# Patient Record
Sex: FEMALE | Race: WHITE | Employment: FULL TIME | URBAN - METROPOLITAN AREA
[De-identification: names, ages, dates, MRNs, and addresses within clinical notes are randomized per-mention and may not be internally consistent; named-entity substitution may affect disease eponyms.]

---

## 2023-04-30 PROBLEM — F33.0 MILD EPISODE OF RECURRENT MAJOR DEPRESSIVE DISORDER: Status: ACTIVE | Noted: 2023-04-30

## 2023-04-30 PROBLEM — I10 ESSENTIAL HYPERTENSION: Status: ACTIVE | Noted: 2023-04-30

## 2023-04-30 PROBLEM — F33.0 MILD EPISODE OF RECURRENT MAJOR DEPRESSIVE DISORDER (HCC): Status: ACTIVE | Noted: 2023-04-30

## 2023-04-30 PROBLEM — G43.709 CHRONIC MIGRAINE WITHOUT AURA WITHOUT STATUS MIGRAINOSUS, NOT INTRACTABLE: Status: ACTIVE | Noted: 2023-04-30

## 2023-04-30 PROBLEM — N39.3 STRESS INCONTINENCE: Status: ACTIVE | Noted: 2023-04-30

## 2023-05-16 NOTE — TELEPHONE ENCOUNTER
----- Message from Ela Mills MD sent at 5/12/2023  1:07 PM CDT -----  Please let her know:  1) CMP, UA, CBC, A1c, TSH is ok  2) Triglycerides are high.  Diet/Exercise needed

## 2023-07-12 NOTE — TELEPHONE ENCOUNTER
Unable to leave a message, busy tone at the end of the call. Edgar Pinto MD  6/14/2023  9:22 AM CDT       Petersen mammograma esta estable con densidad benigna. En 1 year lo repetimos.

## 2023-07-19 NOTE — TELEPHONE ENCOUNTER
Patient is going to drop off physical forms from work. Patient states that previous physical forms were not accepted by her workplace. She did have a physical for this year that was done on April 26. Please advise patient on whether those forms can be filled out with physical date of service that was on 04/26.

## 2024-04-19 DIAGNOSIS — I10 ESSENTIAL HYPERTENSION: ICD-10-CM

## 2024-04-19 DIAGNOSIS — G43.709 CHRONIC MIGRAINE WITHOUT AURA WITHOUT STATUS MIGRAINOSUS, NOT INTRACTABLE: ICD-10-CM

## 2024-04-19 DIAGNOSIS — F33.0 MILD EPISODE OF RECURRENT MAJOR DEPRESSIVE DISORDER (HCC): ICD-10-CM

## 2024-04-25 RX ORDER — CITALOPRAM 20 MG/1
20 TABLET ORAL DAILY
Qty: 30 TABLET | Refills: 0 | OUTPATIENT
Start: 2024-04-25

## 2024-04-25 RX ORDER — PROPRANOLOL HYDROCHLORIDE 80 MG/1
1 CAPSULE, EXTENDED RELEASE ORAL DAILY
Qty: 30 CAPSULE | Refills: 0 | OUTPATIENT
Start: 2024-04-25

## 2024-04-28 NOTE — PROGRESS NOTES
CC: Annual Physical Exam    HPI:   Malu Madrid is a 49 year old female who presents for a complete physical exam.    HCM  -Diet:  Well-balanced.   -Exercise: active  -Skin care:  no concerning lesions/moles      Migraines-> stable    MDD-> stable    HTN-> asymptomatic    Wants to see OBG      Wt Readings from Last 6 Encounters:   04/22/24 174 lb (78.9 kg)   09/21/23 170 lb (77.1 kg)   04/26/23 167 lb (75.8 kg)     Body mass index is 33.98 kg/m².     Results for orders placed or performed in visit on 04/26/23   Comp Metabolic Panel (14)   Result Value Ref Range    Glucose 92 70 - 99 mg/dL    Sodium 140 136 - 145 mmol/L    Potassium 4.0 3.5 - 5.1 mmol/L    Chloride 108 98 - 112 mmol/L    CO2 27.0 21.0 - 32.0 mmol/L    Anion Gap 5 0 - 18 mmol/L    BUN 11 7 - 18 mg/dL    Creatinine 0.72 0.55 - 1.02 mg/dL    BUN/CREA Ratio 15.3 10.0 - 20.0    Calcium, Total 9.0 8.5 - 10.1 mg/dL    Calculated Osmolality 289 275 - 295 mOsm/kg    eGFR-Cr 102 >=60 mL/min/1.73m2    ALT 44 13 - 56 U/L    AST 37 15 - 37 U/L    Alkaline Phosphatase 83 39 - 100 U/L    Bilirubin, Total 0.4 0.1 - 2.0 mg/dL    Total Protein 7.4 6.4 - 8.2 g/dL    Albumin 3.6 3.4 - 5.0 g/dL    Globulin  3.8 2.8 - 4.4 g/dL    A/G Ratio 0.9 (L) 1.0 - 2.0    Patient Fasting for CMP? Yes    Hemoglobin A1C   Result Value Ref Range    HgbA1C 5.6 <5.7 %    Estimated Average Glucose 114 68 - 126 mg/dL   Lipid Panel   Result Value Ref Range    Cholesterol, Total 182 <200 mg/dL    HDL Cholesterol 49 40 - 59 mg/dL    Triglycerides 230 (H) 30 - 149 mg/dL    LDL Cholesterol 94 <100 mg/dL    VLDL 38 (H) 0 - 30 mg/dL    Non HDL Chol 133 (H) <130 mg/dL    Patient Fasting for Lipid? Yes    TSH W Reflex To Free T4   Result Value Ref Range    TSH 3.040 0.358 - 3.740 mIU/mL   Urinalysis, Routine   Result Value Ref Range    Urine Color Yellow Yellow    Clarity Urine Clear Clear    Spec Gravity 1.026 1.005 - 1.030    Glucose Urine Normal Normal mg/dL    Bilirubin Urine Negative Negative     Ketones Urine Negative Negative mg/dL    Blood Urine Negative Negative    pH Urine 5.5 5.0 - 8.0    Protein Urine Negative Negative, Trace mg/dL    Urobilinogen Urine Normal Normal    Nitrite Urine Negative Negative    Leukocyte Esterase Urine Negative Negative    WBC Urine 1-5 0 - 5 /HPF    RBC Urine 0-2 0 - 2 /HPF    Bacteria Urine Rare (A) None Seen /HPF    Squamous Epi. Cells Few (A) None Seen /HPF   CBC W/ DIFFERENTIAL   Result Value Ref Range    WBC 4.3 4.0 - 11.0 x10(3) uL    RBC 4.23 3.80 - 5.30 x10(6)uL    HGB 13.7 12.0 - 16.0 g/dL    HCT 41.5 35.0 - 48.0 %    MCV 98.1 80.0 - 100.0 fL    MCH 32.4 26.0 - 34.0 pg    MCHC 33.0 31.0 - 37.0 g/dL    RDW-SD 47.1 (H) 35.1 - 46.3 fL    RDW 13.0 11.0 - 15.0 %    .0 150.0 - 450.0 10(3)uL    Neutrophil Absolute Prelim 1.36 (L) 1.50 - 7.70 x10 (3) uL    Neutrophil Absolute 1.36 (L) 1.50 - 7.70 x10(3) uL    Lymphocyte Absolute 2.35 1.00 - 4.00 x10(3) uL    Monocyte Absolute 0.40 0.10 - 1.00 x10(3) uL    Eosinophil Absolute 0.14 0.00 - 0.70 x10(3) uL    Basophil Absolute 0.04 0.00 - 0.20 x10(3) uL    Immature Granulocyte Absolute 0.01 0.00 - 1.00 x10(3) uL    Neutrophil % 31.6 %    Lymphocyte % 54.7 %    Monocyte % 9.3 %    Eosinophil % 3.3 %    Basophil % 0.9 %    Immature Granulocyte % 0.2 %       Current Outpatient Medications   Medication Sig Dispense Refill    Propranolol HCl ER 80 MG Oral Capsule SR 24 Hr Take 1 capsule (80 mg total) by mouth daily. 90 capsule 3    citalopram 20 MG Oral Tab Take 1 tablet (20 mg total) by mouth daily. 90 tablet 3    SUMAtriptan 50 MG Oral Tab Take 1 tablet (50 mg total) by mouth every 2 (two) hours as needed for Migraine (MAX 2 tablets in 24hrs). 15 tablet 0    metroNIDAZOLE 0.75 % External Gel 1 topical application daily as needed for rosacea 45 g 0    Aluminum Chloride (DRYSOL) 20 % External Solution Apply once daily at bedtime; once excessive sweating has stopped, may decrease to once or twice weekly, or as needed. Wash  treated area in the morning. 60 mL 1      No Known Allergies   Past Medical History:    Essential hypertension      Past Surgical History:   Procedure Laterality Date    Cholecystectomy      Foot fracture surgery        Family History   Problem Relation Age of Onset    Ovarian Cancer Sister 44    Uterine Cancer Sister       Social History:   Social History     Socioeconomic History    Marital status:    Tobacco Use    Smoking status: Never     Passive exposure: Never    Smokeless tobacco: Never   Substance and Sexual Activity    Alcohol use: Not Currently    Drug use: Never            REVIEW OF SYSTEMS:   Review of Systems   Constitutional:  Negative for chills, fatigue and fever.   Respiratory:  Negative for cough and shortness of breath.    Cardiovascular:  Negative for chest pain, palpitations and leg swelling.   Gastrointestinal:  Negative for abdominal pain, constipation, diarrhea and vomiting.   Neurological:  Negative for headaches.            PHYSICAL EXAM:   /68   Pulse 62   Temp 98.4 °F (36.9 °C)   Ht 5' (1.524 m)   Wt 174 lb (78.9 kg)   SpO2 97%   BMI 33.98 kg/m²  Estimated body mass index is 33.98 kg/m² as calculated from the following:    Height as of this encounter: 5' (1.524 m).    Weight as of this encounter: 174 lb (78.9 kg).     Wt Readings from Last 3 Encounters:   04/22/24 174 lb (78.9 kg)   09/21/23 170 lb (77.1 kg)   04/26/23 167 lb (75.8 kg)       Physical Exam  Vitals reviewed.   Constitutional:       General: She is not in acute distress.     Appearance: She is well-developed.   HENT:      Head: Normocephalic.      Right Ear: Tympanic membrane and external ear normal.      Left Ear: Tympanic membrane and external ear normal.   Eyes:      Conjunctiva/sclera: Conjunctivae normal.      Pupils: Pupils are equal, round, and reactive to light.   Neck:      Thyroid: No thyromegaly.   Cardiovascular:      Rate and Rhythm: Normal rate and regular rhythm.      Heart sounds: Normal  heart sounds. No murmur heard.  Pulmonary:      Effort: Pulmonary effort is normal. No respiratory distress.      Breath sounds: Normal breath sounds.   Abdominal:      General: Bowel sounds are normal. There is no distension.      Palpations: Abdomen is soft.      Tenderness: There is no abdominal tenderness.   Musculoskeletal:         General: Normal range of motion.      Cervical back: Normal range of motion and neck supple.      Right lower leg: No edema.      Left lower leg: No edema.   Skin:     Findings: No rash.   Neurological:      General: No focal deficit present.      Cranial Nerves: No cranial nerve deficit.           ASSESSMENT AND PLAN:   Malu Madrid is a 49 year old female who presents for a complete physical exam.    Health maintenance, will check:   Orders Placed This Encounter   Procedures    CBC With Differential With Platelet    Comp Metabolic Panel (14)    Hemoglobin A1C    Lipid Panel    TSH W Reflex To Free T4    Urinalysis, Routine       Diagnoses and all orders for this visit:    Wellness examination  -     CBC With Differential With Platelet  -     Comp Metabolic Panel (14)  -     Hemoglobin A1C  -     Lipid Panel  -     TSH W Reflex To Free T4  -     Urinalysis, Routine  -     Pt reassured and all questions answered.  -     Age/sex specific preventive measures/immunizations reviewed and discussed with pt.  -     Pt counseled with regards to diet and exercise.    Essential hypertension  -     Propranolol HCl ER 80 MG Oral Capsule SR 24 Hr; Take 1 capsule (80 mg total) by mouth daily.  -stable; controlled  -continue meds  -watch salt intake, regular exercise, DASH/heart healthy eating  -monitor home BP regularly and maintain log; if elevated reading >160/100 notify Md.    Chronic migraine without aura without status migrainosus, not intractable  -     Propranolol HCl ER 80 MG Oral Capsule SR 24 Hr; Take 1 capsule (80 mg total) by mouth daily.    Mild episode of recurrent major depressive  disorder (HCC)  -     citalopram 20 MG Oral Tab; Take 1 tablet (20 mg total) by mouth daily.    Encounter for screening mammogram for malignant neoplasm of breast  -     [facility] WILVER 2D+3D SCREENING BILAT (CPT=77067/41664); Future    Colon cancer screening  -     [facility] Telephone Colon Screen; Future    Well woman exam  -     OBG Referral - In [facility]         Health Maintenance Due   Topic Date Due    Colorectal Cancer Screening  Never done    COVID-19 Vaccine (4 - 2023-24 season) 09/01/2023    Zoster Vaccines (1 of 2) Never done    Annual Physical  04/26/2024    Mammogram  06/10/2024         The patient indicates understanding of these issues and agrees to the plan.  Return if symptoms worsen or fail to improve.  Reasurrance and education provided. All questions answered. Red flags/ ER precautions discussed.      Spent 40 minutes (20 in preventative and 20 in acute/chronic)  including chart review, reviewing appropriate medical history, evaluating patient, discussing treatment options, counseling and education (diet and exercise), ordering appropriate diagnostic tests and completing documentation.

## 2024-06-05 NOTE — TELEPHONE ENCOUNTER
----- Message from eDb Jason sent at 5/31/2024  6:58 AM CDT -----  Please let her know:    Tus laboratorios demuestran:  -Tus electrolytas, ri~nones, y tiroide estan normal. Los numeros del higado estan elevados. Aunque estos fluctuan mucho hay que trabajar en reversarlos para evitar daño. Necesitas  trabajar en el peso, dieta baja en el colesterol y ejercisio al igual que minimizar possible consumo de alcohol y tylenol. En 6 meses, debemos repetir los niveles. Ya la orden esta puesta en el laboratorio.  -Tu colesterol esta un poco elevado. Dieta baja en grasa y ejercisio es necesario para prevenir estar en medicina.  -La orina no tiene proteina ni tacos microscopica. No hay infeccion  -Tienes prediabetes y necesitas lorene dieta baja en carbohydratos y azucar al igual que ejercisio.  -No tienes anemia. Los globulos blancos estan un poco bajos michelle no hay infeccion. En 6 meses, lo repetimos tambien.

## 2024-06-05 NOTE — PROGRESS NOTES
Malu Madrid is a 49 year old female  No LMP recorded. Patient is postmenopausal.   Chief Complaint   Patient presents with    Annual     Pt here for annual      Menses, none since one year ago. Pt states when she coughs or laugh or sneeze , leakage of urine  OBSTETRICS HISTORY:     OB History    Para Term  AB Living   3 3           SAB IAB Ectopic Multiple Live Births                  # Outcome Date GA Lbr Robe/2nd Weight Sex Type Anes PTL Lv   3 Para      NORMAL SPONT      2 Para      NORMAL SPONT      1 Para      NORMAL SPONT          GYNE HISTORY:     Hx Prior Abnormal Pap: No  Pap Result Notes: per pt normal   Use of Birth Control (if yes, specify type): Postmenopausal (2024  2:45 PM)  Hx Prior Abnormal Pap: No (2024  2:45 PM)  Pap Result Notes: per pt normal (2024  2:45 PM)         No data to display                  MEDICAL HISTORY:     Past Medical History:    Essential hypertension       SURGICAL HISTORY:     Past Surgical History:   Procedure Laterality Date    Cholecystectomy      Foot fracture surgery         SOCIAL HISTORY:     Social History     Socioeconomic History    Marital status:    Tobacco Use    Smoking status: Never     Passive exposure: Never    Smokeless tobacco: Never   Substance and Sexual Activity    Alcohol use: Not Currently    Drug use: Never        FAMILY HISTORY:     Family History   Problem Relation Age of Onset    Ovarian Cancer Sister 44    Uterine Cancer Sister        MEDICATIONS:       Current Outpatient Medications:     Propranolol HCl ER 80 MG Oral Capsule SR 24 Hr, Take 1 capsule (80 mg total) by mouth daily., Disp: 90 capsule, Rfl: 3    citalopram 20 MG Oral Tab, Take 1 tablet (20 mg total) by mouth daily., Disp: 90 tablet, Rfl: 3    SUMAtriptan 50 MG Oral Tab, Take 1 tablet (50 mg total) by mouth every 2 (two) hours as needed for Migraine (MAX 2 tablets in 24hrs)., Disp: 15 tablet, Rfl: 0    metroNIDAZOLE 0.75 % External Gel, 1 topical  application daily as needed for rosacea, Disp: 45 g, Rfl: 0    Aluminum Chloride (DRYSOL) 20 % External Solution, Apply once daily at bedtime; once excessive sweating has stopped, may decrease to once or twice weekly, or as needed. Wash treated area in the morning., Disp: 60 mL, Rfl: 1    ALLERGIES:     No Known Allergies      REVIEW OF SYSTEMS:     Constitutional:    denies fever / chills  Eyes:     denies blurred or double vision  Cardiovascular:  denies chest pain or palpitations  Respiratory:    denies shortness of breath  Gastrointestinal:  denies severe abdominal pain, frequent diarrhea or constipation, nausea / vomiting  Genitourinary:    denies dysuria, bothersome incontinence  Skin/Breast:   denies any breast pain, lumps, or discharge  Neurological:    denies frequent severe headaches  Psychiatric:   denies depression or anxiety, thoughts of harming self or others  Heme/Lymph:    denies easy bruising or bleeding      PHYSICAL EXAM:   Blood pressure 129/80, pulse 68, height 5' (1.524 m), weight 175 lb (79.4 kg), not currently breastfeeding.  Constitutional:  well developed, well nourished  Head/Face:  normocephalic  Neck/Thyroid: thyroid symmetric, no thyromegaly, no nodules, no adenopathy  Lymphatic: no abnormal supraclavicular or axillary adenopathy is noted  Respiratory:      chest wall symmetric and nontender on palpation, clear to asculation bilateral, no wheezing, rales, ronchi, and resonance normal upon percussion  Cardiovascular: chest normal in appearance, regular rate and rhythm, no murmurs, PMI palpated midclavicular line  Breast:   normal without palpable masses, tenderness, asymmetry, nipple discharge, nipple retraction or skin changes  Abdomen:   soft, nontender, nondistended, no masses  Skin/Hair:  no unusual rashes or bruises  Extremities:  no edema, no cyanosis, non tender bilaterally  Psychiatric:   oriented to time, place, person and situation. Appropriate mood and affect    Pelvic  Exam:  External Genitalia:  normal appearance, hair distribution, and no lesions  Urethral Meatus:   normal in size, location, without lesions   Bladder:    no fullness, masses or tenderness  Vagina:    normal appearance without lesions, no abnormal discharge  Cervix:    No lesions, normal friability   Uterus:    normal in size, 8 wk sized, normal contour, position, mobility, without  motion tenderness  Adnexa:   normal without masses or tenderness  Perineum:   normal  Anus: no hemorroids         ASSESSMENT & PLAN:     Malu was seen today for annual.    Diagnoses and all orders for this visit:    Normal gynecologic examination  -     ThinPrep Pap with HPV Reflex, Chlamydia/GC; Future  -     Chlamydia/Gc Amplification  Nutrition, weight screening and exercise were discussed with the patient.  Exercise should encompass approximately 150 minutes/week.  Self breast exam counseling was also given.  I advised the patient to avoid tobacco, drugs and alcohol.  Influenza vaccine was offered if seasonally appropriate.  HPV and STD prevention counseling was given.  Health maintenance checklist  was reviewed including Pap smear, cervical cultures, and mammogram screening if age-appropriate.  Appropriate follow-up scheduling was discussed with the patient.      Stress incontinence    Stress incontinence in female  -     OP REFERRAL TO UROGYNECOLOGY CLINIC  Referral to urogynecology given.         FOLLOW-UP     Return in about 1 year (around 6/5/2025) for Annual exam.      Bi Block MD  6/5/2024

## 2024-07-09 ENCOUNTER — HOSPITAL ENCOUNTER (OUTPATIENT)
Dept: MAMMOGRAPHY | Facility: HOSPITAL | Age: 49
Discharge: HOME | End: 2024-07-09
Attending: FAMILY MEDICINE
Payer: COMMERCIAL

## 2024-07-09 DIAGNOSIS — Z12.31 ENCOUNTER FOR SCREENING MAMMOGRAM FOR MALIGNANT NEOPLASM OF BREAST: ICD-10-CM

## 2024-07-09 PROCEDURE — 77063 BREAST TOMOSYNTHESIS BI: CPT | Performed by: FAMILY MEDICINE

## 2024-07-09 PROCEDURE — 77067 SCR MAMMO BI INCL CAD: CPT | Performed by: FAMILY MEDICINE

## 2024-07-15 ENCOUNTER — TELEPHONE (OUTPATIENT)
Dept: INTERNAL MEDICINE CLINIC | Facility: CLINIC | Age: 49
End: 2024-07-15

## 2024-07-15 NOTE — TELEPHONE ENCOUNTER
----- Message from Deb Jason sent at 7/14/2024  1:29 PM CDT -----  Please let her know that her mammogram is stable with benign appearing density. Next in 1yr

## 2024-07-15 NOTE — TELEPHONE ENCOUNTER
Results given and discussed with patient above. Recommendations were made. Patient verbalized understanding. No further questions at thins time.

## 2024-07-24 NOTE — H&P
[facility] - Gastroenterology                                                                                                               Reason for consult: eval    Requesting physician or provider: Deb Jason MD    Chief Complaint   Patient presents with    Colonoscopy Screening     No symptoms and no family history.       HPI:   Malu Sher is a 49 year old year-old female with history of htn:    she is here today for evaluation prior to cln  She is [place] speaking and an  was used for today's visit    she moves her bowels daily and without recent change. she denies straining and/or incomplete evacuation.  she denies brbpr and/or melena.    Mild heartburn. Is not on medication. she denies dysphagia, odynophagia and/or globus. she denies abdominal pain. she denies nausea and/or vomiting.  she denies recent change in appetite and/or unintentional weight loss.    NSAIDS: no  Tobacco: no  Alcohol: no  Marijuana: no  Illicit drugs: no    No FH GI malignancy    No history of adverse reaction to sedation  No RUSS  No anticoagulants  No pacemaker/defibrillator  No pain medications and/or sleep aides      Last colonoscopy: no  Last EGD: no    Wt Readings from Last 6 Encounters:   07/30/24 175 lb (79.4 kg)   06/05/24 175 lb (79.4 kg)   04/22/24 174 lb (78.9 kg)   09/21/23 170 lb (77.1 kg)   04/26/23 167 lb (75.8 kg)        History, Medications, Allergies, ROS:      Past Medical History:    Essential hypertension      Past Surgical History:   Procedure Laterality Date    Cholecystectomy      Foot fracture surgery        Family Hx:   Family History   Problem Relation Age of Onset    Uterine Cancer Sister       Social History:   Social History     Socioeconomic History    Marital status:    Tobacco Use    Smoking status: Never     Passive exposure: Never    Smokeless tobacco: Never    Substance and Sexual Activity    Alcohol use: Not Currently    Drug use: Never        Medications (Active prior to today's visit):  Current Outpatient Medications   Medication Sig Dispense Refill    PEG 3350-KCl-Na Bicarb-NaCl (TRILYTE) 420 g Oral Recon Soln Take prep as directed by gastro office. May substitute with Trilyte/generic equivalent if needed. 4000 mL 0    Propranolol HCl ER 80 MG Oral Capsule SR 24 Hr Take 1 capsule (80 mg total) by mouth daily. 90 capsule 3    citalopram 20 MG Oral Tab Take 1 tablet (20 mg total) by mouth daily. 90 tablet 3    SUMAtriptan 50 MG Oral Tab Take 1 tablet (50 mg total) by mouth every 2 (two) hours as needed for Migraine (MAX 2 tablets in 24hrs). 15 tablet 0    metroNIDAZOLE 0.75 % External Gel 1 topical application daily as needed for rosacea 45 g 0    Aluminum Chloride (DRYSOL) 20 % External Solution Apply once daily at bedtime; once excessive sweating has stopped, may decrease to once or twice weekly, or as needed. Wash treated area in the morning. 60 mL 1       Allergies:  No Known Allergies    ROS:   CONSTITUTIONAL: negative for fevers, chills, sweats and weight loss  EYES Negative for red eyes, yellow eyes, changes in vision  HEENT: Negative for dysphagia and hoarseness  RESPIRATORY: Negative for cough and shortness of breath  CARDIOVASCULAR: Negative for chest pain, palpitations  GASTROINTESTINAL: See HPI  GENITOURINARY: Negative for dysuria and frequency  MUSCULOSKELETAL: Negative for arthralgias and myalgias  NEUROLOGICAL: Negative for dizziness and headaches  BEHAVIOR/PSYCH: Negative for anxiety and poor appetite    PHYSICAL EXAM:   Blood pressure 124/83, pulse 76, height 5' (1.524 m), weight 175 lb (79.4 kg), not currently breastfeeding.    GEN: WD/WN, NAD  HEENT: Supple symmetrical, trachea midline  CV: RRR, the extremities are warm and well perfused   LUNGS: No increased work of breathing  ABDOMEN: No scars, normal bowel sounds, soft, non-tender, non-distended  no rebound or guarding, no masses, no hepatomegaly  MSK: No redness, no warmth, no swelling of joints  SKIN: No jaundice, no erythema, no rashes  HEMATOLOGIC: No bleeding, no bruising  NEURO: Alert and interactive, normal gait    Labs/Imaging/Procedures:     Patient's pertinent labs and imaging were reviewed and discussed with patient today.        .  ASSESSMENT/PLAN:   Malu Sher is a 49 year old year-old female with history of htn:    #heartburn  Symptoms mild and w/o red flags.  Plan as below.     #crc screening  She is here today as a referral from her PCP for evaluation prior to undergoing colonoscopy for CRC screening.  She denies red flags such as recent change in bm, brbpr, and/or melena.  SHe denies a FHx GI malignancy.  SHe has not had a colonoscopy and so is now due for CRC screening. We discussed the available screening options for CRC such as FIT, cologuard, and CT colonography as well as efficacy of these modalities. They are electing to pursue cln at this time.      -reflux diet modification  -pepcid as needed  -hpylori testing    1. Schedule colonoscopy with MAC w/ general pool MD [Diagnosis: crc screening]    2.  bowel prep from pharmacy ([facility])    3.   For cardiology patients and patients on blood thinners:  Please contact your cardiology clinic for clearance to proceed with the endoscopic procedure. If you are on blood thinners, please also confirm with your cardiologic clinic that you are able to hold the blood thinner per our recommendations.\"    BLOOD THINNER ORDERS:  -Hold for 48 hours (Xarelto, Eliquis, Pradaxa, Savaysa)  -Hold for 3 days (Pletal)  -Hold for 5 days (Coumadin, Plavix, Brilinta, Aggrenox)  -Hold for 7 days (Effient)     For endocrinology insulin patients:    Please contact your endocrinology clinic for insulin adjustment orders prior to your endoscopic procedure.    4. Read all bowel prep instructions carefully. Bowel prep instructions can also be found  online at:  [email or web address]     5. AVOID seeds, nuts, popcorn, raw fruits and vegetables for 3 days before procedure    6. You MAY need to go for COVID testing 72 hours before procedure. The testing team will call you a few days before your procedure to discuss with you if testing is required. If you are asked to go for COVID testing and do not completed the test, the procedure cannot be performed.     7. If you start any NEW medication after your visit today, please notify us. Certain medications (like iron or weight loss medications) will need to be held before the procedure, or the procedure cannot be performed safely.            Orders This Visit:  Orders Placed This Encounter   Procedures    Helicobacter Pylori Breath Test, Adult       Meds This Visit:  Requested Prescriptions     Signed Prescriptions Disp Refills    PEG 3350-KCl-Na Bicarb-NaCl (TRILYTE) 420 g Oral Recon Soln 4000 mL 0     Sig: Take prep as directed by gastro office. May substitute with Trilyte/generic equivalent if needed.       Imaging & Referrals:  None    ENDOSCOPIC RISK BENEFIT DISCUSSION: I described the procedure in great detail with the patient. I discussed the risks and benefits, including but not limited to: bleeding, perforation, infection, anesthesia complications, and even death. Patient will be NPO after midnight and will have a person physically present at time of pick-up to drive patient home. Patient verbalized understanding and agrees to proceed with procedure as planned.    Keyla Nichols, APRN   7/30/2024        This note was partially prepared using Dragon Medical voice recognition dictation software. As a result, errors may occur. When identified, these errors have been corrected. While every attempt is made to correct errors during dictation, discrepancies may still exist.

## 2024-07-30 ENCOUNTER — OFFICE VISIT (OUTPATIENT)
Facility: CLINIC | Age: 49
End: 2024-07-30

## 2024-07-30 VITALS
BODY MASS INDEX: 34.36 KG/M2 | WEIGHT: 175 LBS | DIASTOLIC BLOOD PRESSURE: 83 MMHG | HEART RATE: 76 BPM | SYSTOLIC BLOOD PRESSURE: 124 MMHG | HEIGHT: 60 IN

## 2024-07-30 DIAGNOSIS — Z12.11 COLON CANCER SCREENING: Primary | ICD-10-CM

## 2024-07-30 DIAGNOSIS — R12 HEARTBURN: ICD-10-CM

## 2024-07-30 PROCEDURE — S0285 CNSLT BEFORE SCREEN COLONOSC: HCPCS | Performed by: NURSE PRACTITIONER

## 2024-07-30 RX ORDER — POLYETHYLENE GLYCOL 3350, SODIUM CHLORIDE, SODIUM BICARBONATE, POTASSIUM CHLORIDE 420; 11.2; 5.72; 1.48 G/4L; G/4L; G/4L; G/4L
POWDER, FOR SOLUTION ORAL
Qty: 4000 ML | Refills: 0 | Status: SHIPPED | OUTPATIENT
Start: 2024-07-30

## 2024-07-30 NOTE — PATIENT INSTRUCTIONS
-reflux diet modification  -pepcid as needed  -hpylori testing    1. Schedule colonoscopy with MAC w/ general pool MD [Diagnosis: crc screening]    2.  bowel prep from pharmacy ([facility])    3.   For cardiology patients and patients on blood thinners:  Please contact your cardiology clinic for clearance to proceed with the endoscopic procedure. If you are on blood thinners, please also confirm with your cardiologic clinic that you are able to hold the blood thinner per our recommendations.\"    BLOOD THINNER ORDERS:  -Hold for 48 hours (Xarelto, Eliquis, Pradaxa, Savaysa)  -Hold for 3 days (Pletal)  -Hold for 5 days (Coumadin, Plavix, Brilinta, Aggrenox)  -Hold for 7 days (Effient)     For endocrinology insulin patients:    Please contact your endocrinology clinic for insulin adjustment orders prior to your endoscopic procedure.    4. Read all bowel prep instructions carefully. Bowel prep instructions can also be found online at:  [email or web address]/giprep     5. AVOID seeds, nuts, popcorn, raw fruits and vegetables for 3 days before procedure    6. You MAY need to go for COVID testing 72 hours before procedure. The testing team will call you a few days before your procedure to discuss with you if testing is required. If you are asked to go for COVID testing and do not completed the test, the procedure cannot be performed.     7. If you start any NEW medication after your visit today, please notify us. Certain medications (like iron or weight loss medications) will need to be held before the procedure, or the procedure cannot be performed safely.          Consejos para controlar el reflujo de ácido (agruras)   Para controlar el reflujo de ácido es necesario hacer algunos cambios básicos en la alimentación y el estilo de jessee. Los siguientes consejos pueden ser suficientes para aliviar el malestar.   Preste atención a lo que come  No ingiera comidas grasosas o muy condimentadas.  Coma menos alimentos  ácidos, jo cítricos y alimentos a base de tomates, ya que pueden agravar los síntomas.  Limite el consumo de alcohol, cafeína y bebidas gaseosas. Todas estas bebidas aumentan el reflujo.  Intente limitar el consumo de chocolate, menta y hierbabuena. Alysa puede empeorar el reflujo de ácido en algunas personas.     Vigile cuándo come  No se acueste ely 3 horas después de sheri comido.  No coma nada antes de irse a la cama.     Mantenga la juan levantada  Mantener la juan y el pecho elevados unas 4 a 6 pulgadas lo ayudará a aliviar el reflujo cuando esté acostado. Ponga soportes debajo de la cabecera de la cama o lorene cuña debajo del colchón para elevarlos.     Otros cambios  Baje de peso, si es necesario.  No lucio ejercicio poco antes de acostarse.  No use ropa ajustada.  Limite el uso de aspirina e ibuprofeno.  Deje de fumar.        © 2923-4361 [employer]. Todos los derechos reservados. Esta información no pretende sustituir la atención médica profesional. Sólo chance médico puede diagnosticar y tratar un problema de alexis.

## 2024-07-31 ENCOUNTER — TELEPHONE (OUTPATIENT)
Facility: CLINIC | Age: 49
End: 2024-07-31

## 2024-07-31 DIAGNOSIS — Z12.11 COLON CANCER SCREENING: Primary | ICD-10-CM

## 2024-07-31 NOTE — TELEPHONE ENCOUNTER
Patient was seen in office today with JACOBO Valdez and was given orders to schedule. Please call patient to schedule his/her procedure with the orders given below. Patient was given the phone number and prep instructions at the time of the office visit. The prep instructions was also explained to the patient at the time of the visit. The patient verbalized understanding the prep and that a GI  will call him/her for the procedure.  If patient calls before the 1 week turnaround please schedule with the orders given below, thank you!    Orders from JACOBO Valdez    1. Schedule colonoscopy with MAC w/ general pool MD [Diagnosis: crc screening]     2.  bowel prep from pharmacy ([facility])     3.   For cardiology patients and patients on blood thinners:  Please contact your cardiology clinic for clearance to proceed with the endoscopic procedure. If you are on blood thinners, please also confirm with your cardiologic clinic that you are able to hold the blood thinner per our recommendations.\"     BLOOD THINNER ORDERS:  -Hold for 48 hours (Xarelto, Eliquis, Pradaxa, Savaysa)  -Hold for 3 days (Pletal)  -Hold for 5 days (Coumadin, Plavix, Brilinta, Aggrenox)  -Hold for 7 days (Effient)      For endocrinology insulin patients:     Please contact your endocrinology clinic for insulin adjustment orders prior to your endoscopic procedure.     4. Read all bowel prep instructions carefully. Bowel prep instructions can also be found online at:  [email or web address]      5. AVOID seeds, nuts, popcorn, raw fruits and vegetables for 3 days before procedure     6. You MAY need to go for COVID testing 72 hours before procedure. The testing team will call you a few days before your procedure to discuss with you if testing is required. If you are asked to go for COVID testing and do not completed the test, the procedure cannot be performed.      7. If you start any NEW medication after your visit  today, please notify us. Certain medications (like iron or weight loss medications) will need to be held before the procedure, or the procedure cannot be performed safely.

## 2024-08-27 ENCOUNTER — TELEPHONE (OUTPATIENT)
Dept: UROLOGY | Facility: CLINIC | Age: 49
End: 2024-08-27

## 2024-09-03 ENCOUNTER — OFFICE VISIT (OUTPATIENT)
Dept: UROLOGY | Facility: CLINIC | Age: 49
End: 2024-09-03
Attending: OBSTETRICS & GYNECOLOGY
Payer: COMMERCIAL

## 2024-09-03 VITALS
HEIGHT: 60 IN | SYSTOLIC BLOOD PRESSURE: 128 MMHG | WEIGHT: 172 LBS | TEMPERATURE: 98 F | DIASTOLIC BLOOD PRESSURE: 78 MMHG | BODY MASS INDEX: 33.77 KG/M2

## 2024-09-03 DIAGNOSIS — R35.1 NOCTURIA: ICD-10-CM

## 2024-09-03 DIAGNOSIS — N39.3 FEMALE STRESS INCONTINENCE: Primary | ICD-10-CM

## 2024-09-03 DIAGNOSIS — N94.10 DYSPAREUNIA, FEMALE: ICD-10-CM

## 2024-09-03 DIAGNOSIS — N81.84 PELVIC MUSCLE WASTING: ICD-10-CM

## 2024-09-03 LAB
BLOOD URINE: NEGATIVE
CONTROL RUN WITHIN 24 HOURS?: YES
LEUKOCYTE ESTERASE URINE: NEGATIVE
NITRITE URINE: NEGATIVE

## 2024-09-03 PROCEDURE — 87086 URINE CULTURE/COLONY COUNT: CPT | Performed by: OBSTETRICS & GYNECOLOGY

## 2024-09-03 PROCEDURE — 99212 OFFICE O/P EST SF 10 MIN: CPT

## 2024-09-03 PROCEDURE — 81002 URINALYSIS NONAUTO W/O SCOPE: CPT | Performed by: OBSTETRICS & GYNECOLOGY

## 2024-09-03 NOTE — PROGRESS NOTES
Adelina Barr,   9/3/2024     Referred by Dr. Block  Pt here with self    Chief Complaint   Patient presents with    Incontinence     Referred by Dr. Block, gyne for LIZABETH       HPI:  +LIZABETH  No UUI  Nocturia x2-3  Denies prolapse  No dyspareunia, some coital incontinence  Reg bowels    PRIOR TREATMENTS:    Kegels    No  UTIs  No gross hematuria    , vdx3    Completed childbearing  LMP about one year ago    Interested in options    Vitals:  /78   Temp 98.2 °F (36.8 °C)   Ht 60\"   Wt 172 lb (78 kg)   BMI 33.59 kg/m²      HISTORY:  Past Medical History:    Anxiety    Depression    Essential hypertension      Past Surgical History:   Procedure Laterality Date    Cholecystectomy      Foot fracture surgery        Family History   Problem Relation Age of Onset    Uterine Cancer Sister       Social History     Socioeconomic History    Marital status:    Tobacco Use    Smoking status: Never     Passive exposure: Never    Smokeless tobacco: Never   Substance and Sexual Activity    Alcohol use: Not Currently    Drug use: Never        Allergies:  No Known Allergies    Medications:  Outpatient Medications Prior to Visit   Medication Sig Dispense Refill    Propranolol HCl ER 80 MG Oral Capsule SR 24 Hr Take 1 capsule (80 mg total) by mouth daily. 90 capsule 3    citalopram 20 MG Oral Tab Take 1 tablet (20 mg total) by mouth daily. 90 tablet 3    metroNIDAZOLE 0.75 % External Gel 1 topical application daily as needed for rosacea 45 g 0    Aluminum Chloride (DRYSOL) 20 % External Solution Apply once daily at bedtime; once excessive sweating has stopped, may decrease to once or twice weekly, or as needed. Wash treated area in the morning. 60 mL 1    PEG 3350-KCl-Na Bicarb-NaCl (TRILYTE) 420 g Oral Recon Soln Take prep as directed by gastro office. May substitute with Trilyte/generic equivalent if needed. (Patient not taking: Reported on 9/3/2024) 4000 mL 0    SUMAtriptan 50 MG Oral Tab Take 1 tablet (50  mg total) by mouth every 2 (two) hours as needed for Migraine (MAX 2 tablets in 24hrs). (Patient not taking: Reported on 9/3/2024) 15 tablet 0     No facility-administered medications prior to visit.       Urogynecology Summary:  Urogynecology Summary  Prolapse: No  LIZABETH: Yes  Urge Incontinence: No  Nocturia Frequency: 3 (2-3)  Frequency: 2 - 3 hours  Incomplete emptying: Yes (percieved)  Constipation: No  Wears pad day?: 2 (light)  Wears Pad Night?: 0  Activities are limited by UI/POP?: No  Currently Sexually Active: Yes    Review of Systems:    A comprehensive 12 point review of systems was completed.  Pertinent positives noted in the the HPI.  No CP  No SOB    GENERAL EXAM:  GENERAL:  Alert and Oriented, and NAD  HEENT:  Normal, no lesions  LUNGS:  Normal effort  HEART:  RRR  ABDOMEN: soft, no mass, no hernia  EXTREM:  Normal, no edema  SKIN:  Normal, no lesions    PELVIC EXAM:  Ext. Gen: no atrophy, no lesions  Urethra: no atrophy, nontender  Bladder:some fullness, nontender  Vagina: early atrophy  Cervix: no bleeding,  no lesions, nontender  Uterus: +mobile  Adnexa:no masses, nontender  Perineum: nontender  Anus: wnl  Rectum: defer    PELVIS FLOOR NEUROMUSCULAR FUNCTION:  Strength:  1 and Recruitment  Perineal Sensation:  Normal      PELVIC SUPPORT:  [address]:  0  Ant:  0  Post:  0  CST:  negative  UVJ: +hypermobile    Pt examined with desiree, RN    Impression/Plan:    ICD-10-CM    1. Female stress incontinence  N39.3 POCT urinalysis dipstick[55439]     Urine Culture, Routine      2. Nocturia  R35.1 POCT urinalysis dipstick[[id number]]     Urine Culture, Routine      3. Dyspareunia, female  N94.10       4. Pelvic muscle wasting  N81.84           Discussion Items:   Urodynamics and cystoscopy for evaluation of LUTS  Behavioral and pharmacologic treatments for OAB  Nonsurgical and surgical treatments for Stress Urinary Incontinence  Pelvic muscle rehabilitation including pelvic floor PT  Surgical Discussion: We  discussed the risks, benefits, goals and alternatives to surgical approaches for pelvic floor disorders including, but not limited to, bleeding, infection, pelvic organ damage, recurrent prolapse, recurrent stress incontinence, de brianna OAB, pain, sexual dysfunction, voiding dysfunction, long-term catheterization and re-operation.  Post-op restrictions and expectations reviewed.  Discussed dietary and behavioral modification, discussed pharmacologic and nonpharmacologic mgmt options for urinary symptoms.     Diagnostic Items:  Urodynamics  Urine testing    Medications Discussed:  OAB meds    Treatment Plan, Non-surgical:   RN teaching/pt education done    Treatment Plan, Surgical: mentioned  Mid-urethral slings (Trans Vaginal Taping, TOT, single-incision)    Pt verbalizes understanding of all above discussed information. All questions answered. She agrees to plan    Return for UDS, f/u.    Adelina Barr DO, FACOG, FACS      Discussion undertaken in English, info provided      The 21st Century Cures Act makes medical notes like these available to patients in the interest of transparency. However, be advised this is a medical document. It is intended as peer to peer communication. It is written in medical language and may contain abbreviations or verbiage that are unfamiliar. It may appear blunt or direct. Medical documents are intended to carry relevant information, facts as evident, and the clinical opinion of the practitioner.

## 2024-09-13 ENCOUNTER — TELEPHONE (OUTPATIENT)
Dept: GASTROENTEROLOGY | Facility: CLINIC | Age: 49
End: 2024-09-13

## 2024-09-13 NOTE — TELEPHONE ENCOUNTER
1st reminder sent of pending labs:    Helicobacter Pylori Breath Test, Adult (Order #[id number]) on 7/30/24

## 2024-09-19 NOTE — TELEPHONE ENCOUNTER
Scheduled for: Colonoscopy [id number]   Provider Name: Dr Espinoza   Date: Thurs 12/05/2024  Location: [facility]   Sedation: MAC   Time: 12:30 pm, (pt is aware that [facility] will call the day before to confirm arrival time)  Prep: split trilyte   Meds/Allergies Reconciled?: NKDA   Diagnosis with codes: colon screening Z12.11  Was patient informed to call insurance with codes (Y/N): Yes   Referral sent?:  Yes, [facility] or [facility] notified?: Electronic case request was sent to [facility] via [facility]/[employer].      Medication Orders: Patient is aware to NOT take iron pills, herbal meds and diet supplements for 7 days before exam. Also to NOT take any form of alcohol, recreational drugs and any forms of ED meds 24-72 hours before exam.      Misc Orders:  prep instructions mailed out per patients request       Further instructions given by staff: Instructions given over the phone and pt verbalized understanding

## 2024-09-21 ENCOUNTER — LAB ENCOUNTER (OUTPATIENT)
Dept: LAB | Facility: HOSPITAL | Age: 49
End: 2024-09-21
Attending: NURSE PRACTITIONER
Payer: COMMERCIAL

## 2024-09-21 DIAGNOSIS — R12 HEARTBURN: Primary | ICD-10-CM

## 2024-09-21 PROCEDURE — 83013 H PYLORI (C-13) BREATH: CPT

## 2024-09-23 LAB — H PYLORI BREATH TEST: NEGATIVE

## 2024-10-03 ENCOUNTER — TELEPHONE (OUTPATIENT)
Dept: UROLOGY | Facility: CLINIC | Age: 49
End: 2024-10-03

## 2024-10-03 NOTE — TELEPHONE ENCOUNTER
Language line interpretor # [id number] Etienne used to call patient to confirm UDS 10/7/24 @1;30     Patient requests to cancel the UDS and also follow up -  [place] and will need to call back to reschedule    notified t cancel appts

## 2025-04-04 DIAGNOSIS — F33.0 MILD EPISODE OF RECURRENT MAJOR DEPRESSIVE DISORDER: ICD-10-CM

## 2025-04-04 RX ORDER — CITALOPRAM HYDROBROMIDE 20 MG/1
TABLET ORAL
Qty: 30 TABLET | Refills: 29 | OUTPATIENT
Start: 2025-04-04

## 2025-04-30 ENCOUNTER — OFFICE VISIT (OUTPATIENT)
Age: 50
End: 2025-04-30
Payer: COMMERCIAL

## 2025-04-30 VITALS
HEART RATE: 74 BPM | HEIGHT: 60 IN | TEMPERATURE: 98 F | OXYGEN SATURATION: 96 % | SYSTOLIC BLOOD PRESSURE: 120 MMHG | BODY MASS INDEX: 32.98 KG/M2 | WEIGHT: 168 LBS | DIASTOLIC BLOOD PRESSURE: 82 MMHG

## 2025-04-30 DIAGNOSIS — Z71.3 WEIGHT LOSS COUNSELING, ENCOUNTER FOR: ICD-10-CM

## 2025-04-30 DIAGNOSIS — Z12.31 ENCOUNTER FOR SCREENING MAMMOGRAM FOR MALIGNANT NEOPLASM OF BREAST: ICD-10-CM

## 2025-04-30 DIAGNOSIS — Z12.11 COLON CANCER SCREENING: ICD-10-CM

## 2025-04-30 DIAGNOSIS — I10 ESSENTIAL HYPERTENSION: ICD-10-CM

## 2025-04-30 DIAGNOSIS — F33.0 MILD EPISODE OF RECURRENT MAJOR DEPRESSIVE DISORDER: ICD-10-CM

## 2025-04-30 DIAGNOSIS — G43.709 CHRONIC MIGRAINE WITHOUT AURA WITHOUT STATUS MIGRAINOSUS, NOT INTRACTABLE: ICD-10-CM

## 2025-04-30 DIAGNOSIS — L74.510 HYPERHIDROSIS OF AXILLA: ICD-10-CM

## 2025-04-30 DIAGNOSIS — Z00.00 WELLNESS EXAMINATION: Primary | ICD-10-CM

## 2025-04-30 PROCEDURE — 99396 PREV VISIT EST AGE 40-64: CPT | Performed by: FAMILY MEDICINE

## 2025-04-30 PROCEDURE — 99213 OFFICE O/P EST LOW 20 MIN: CPT | Performed by: FAMILY MEDICINE

## 2025-04-30 RX ORDER — SUMATRIPTAN 50 MG/1
50 TABLET, FILM COATED ORAL EVERY 2 HOUR PRN
Qty: 10 TABLET | Refills: 1 | Status: SHIPPED | OUTPATIENT
Start: 2025-04-30

## 2025-04-30 RX ORDER — ALUMINUM CHLORIDE 20 %
SOLUTION, NON-ORAL TOPICAL
Qty: 60 ML | Refills: 3 | Status: SHIPPED | OUTPATIENT
Start: 2025-04-30

## 2025-04-30 RX ORDER — CITALOPRAM HYDROBROMIDE 20 MG/1
20 TABLET ORAL DAILY
Qty: 90 TABLET | Refills: 3 | Status: SHIPPED | OUTPATIENT
Start: 2025-04-30

## 2025-04-30 RX ORDER — TIRZEPATIDE 2.5 MG/.5ML
2.5 INJECTION, SOLUTION SUBCUTANEOUS WEEKLY
Qty: 2 ML | Refills: 0 | Status: SHIPPED | OUTPATIENT
Start: 2025-04-30 | End: 2025-05-22

## 2025-04-30 RX ORDER — PROPRANOLOL HYDROCHLORIDE 80 MG/1
80 CAPSULE, EXTENDED RELEASE ORAL DAILY
Qty: 90 CAPSULE | Refills: 3 | Status: SHIPPED | OUTPATIENT
Start: 2025-04-30

## 2025-05-01 ENCOUNTER — TELEPHONE (OUTPATIENT)
Dept: GASTROENTEROLOGY | Facility: CLINIC | Age: 50
End: 2025-05-01

## 2025-05-01 NOTE — PROGRESS NOTES
CC: Annual Physical Exam    HPI:   Malu Sher is a 50 year old female who presents for a complete physical exam.    HCM  -Diet:  Well-balanced.   -Exercise: active  -Mental Health: denies any depression or anxiety sx. Stable with celexa  -Skin care:  no concerning lesions/moles  -Menopause:  no issues    Migraine-> stable with propranolol; has not used triptan in a long time    Inquiring about GLP1s for weight loss    Needs refills of drysol      Wt Readings from Last 6 Encounters:   04/30/25 168 lb (76.2 kg)   09/03/24 172 lb (78 kg)   07/30/24 175 lb (79.4 kg)   06/05/24 175 lb (79.4 kg)   04/22/24 174 lb (78.9 kg)   09/21/23 170 lb (77.1 kg)     Body mass index is 32.81 kg/m².     Results for orders placed or performed in visit on 09/21/24   Helicobacter Pylori Breath Test, Adult    Collection Time: 09/21/24  7:57 AM   Result Value Ref Range    H pylori Breath Test Negative Negative       Current Medications[1]   Allergies[2]   Past Medical History[3]   Past Surgical History[4]   Family History[5]   Social History:   Short Social Hx on File[6]         REVIEW OF SYSTEMS:   Review of Systems   Constitutional:  Negative for fatigue and fever.   Respiratory:  Negative for cough and shortness of breath.    Cardiovascular:  Negative for chest pain, palpitations and leg swelling.   Gastrointestinal:  Negative for abdominal pain, constipation, diarrhea and vomiting.   Musculoskeletal:  Negative for arthralgias.   Neurological:  Negative for headaches.            PHYSICAL EXAM:   /82   Pulse 74   Temp 97.7 °F (36.5 °C)   Ht 5' (1.524 m)   Wt 168 lb (76.2 kg)   SpO2 96%   BMI 32.81 kg/m²  Estimated body mass index is 32.81 kg/m² as calculated from the following:    Height as of this encounter: 5' (1.524 m).    Weight as of this encounter: 168 lb (76.2 kg).     Wt Readings from Last 3 Encounters:   04/30/25 168 lb (76.2 kg)   09/03/24 172 lb (78 kg)   07/30/24 175 lb (79.4 kg)       Physical Exam  Vitals  reviewed.   Constitutional:       General: She is not in acute distress.     Appearance: She is well-developed.   HENT:      Head: Normocephalic.      Right Ear: Tympanic membrane and external ear normal.      Left Ear: Tympanic membrane and external ear normal.   Eyes:      Conjunctiva/sclera: Conjunctivae normal.      Pupils: Pupils are equal, round, and reactive to light.   Neck:      Thyroid: No thyromegaly.   Cardiovascular:      Rate and Rhythm: Normal rate and regular rhythm.      Heart sounds: Normal heart sounds. No murmur heard.  Pulmonary:      Effort: Pulmonary effort is normal. No respiratory distress.      Breath sounds: Normal breath sounds.   Abdominal:      General: Bowel sounds are normal. There is no distension.      Palpations: Abdomen is soft.      Tenderness: There is no abdominal tenderness.   Musculoskeletal:         General: Normal range of motion.      Cervical back: Normal range of motion and neck supple.      Right lower leg: No edema.      Left lower leg: No edema.   Skin:     Findings: No rash.   Neurological:      General: No focal deficit present.      Cranial Nerves: No cranial nerve deficit.           ASSESSMENT AND PLAN:   Malu Sher is a 50 year old female who presents for a complete physical exam.    Health maintenance, will check:   Orders Placed This Encounter   Procedures    CBC With Differential With Platelet    Comp Metabolic Panel (14)    Hemoglobin A1C    Lipid Panel    TSH W Reflex To Free T4       Diagnoses and all orders for this visit:    Wellness examination  -     CBC With Differential With Platelet  -     Comp Metabolic Panel (14)  -     Hemoglobin A1C  -     Lipid Panel  -     TSH W Reflex To Free T4  -     Pt reassured and all questions answered.  -     Age/sex specific preventive measures/immunizations reviewed and discussed with pt.  -     Pt counseled with regards to diet and exercise.    Encounter for screening mammogram for malignant neoplasm of  breast  -     [facility] WILVER 2D+3D SCREENING BILAT (CPT=77067/27717); Future    Colon cancer screening  -     [facility] GI Telephone Colon Screen; Future    Mild episode of recurrent major depressive disorder  -     citalopram 20 MG Oral Tab; Take 1 tablet (20 mg total) by mouth daily.  -stable; CPM    Essential hypertension  -     Propranolol HCl ER 80 MG Oral Capsule SR 24 Hr; Take 1 capsule (80 mg total) by mouth daily.  -stable; controlled  -continue meds  -watch salt intake, regular exercise, DASH/heart healthy eating  -monitor home BP regularly and maintain log; if elevated reading >160/100 notify Md.    Chronic migraine without aura without status migrainosus, not intractable  -     Propranolol HCl ER 80 MG Oral Capsule SR 24 Hr; Take 1 capsule (80 mg total) by mouth daily.  -     SUMAtriptan 50 MG Oral Tab; Take 1 tablet (50 mg total) by mouth every 2 (two) hours as needed for Migraine (MAX 2 tablets in 24hrs).  -stable; CPM    Hyperhidrosis of axilla  -     Aluminum Chloride (DRYSOL) 20 % External Solution; Apply once daily at bedtime; once excessive sweating has stopped, may decrease to once or twice weekly, or as needed. Wash treated area in the morning.  -stable; CPM    Weight loss counseling, encounter for  -     Tirzepatide-Weight Management (ZEPBOUND) 2.5 MG/0.5ML Subcutaneous Solution Auto-injector; Inject 2.5 mg into the skin once a week for 4 doses.     -     Pt counseled with regards to diet and exercise.  -Trial of mounjaro; SE discussed. Denited MEN/MTC hx  -IF not covered may try phentermine vs weight loss clinic    Health Maintenance Due   Topic Date Due    Colorectal Cancer Screening  Never done    Zoster Vaccines (1 of 2) Never done    COVID-19 Vaccine (4 - 2024-25 season) 09/01/2024    Annual Physical  04/22/2025    Mammogram  07/09/2025         The patient indicates understanding of these issues and agrees to the plan.  Return in about 3 months (around 7/30/2025) for follow-up.  Reasurrance and  education provided. All questions answered. Red flags/ ER precautions discussed.      Spent 30 minutes (10 in preventative and 20 in acute/chronic)  including chart review, reviewing appropriate medical history, evaluating patient, discussing treatment options, counseling and education (diet and exercise), ordering appropriate diagnostic tests and completing documentation.           [1]   Current Outpatient Medications   Medication Sig Dispense Refill    citalopram 20 MG Oral Tab Take 1 tablet (20 mg total) by mouth daily. 90 tablet 3    Propranolol HCl ER 80 MG Oral Capsule SR 24 Hr Take 1 capsule (80 mg total) by mouth daily. 90 capsule 3    SUMAtriptan 50 MG Oral Tab Take 1 tablet (50 mg total) by mouth every 2 (two) hours as needed for Migraine (MAX 2 tablets in 24hrs). 10 tablet 1    Aluminum Chloride (DRYSOL) 20 % External Solution Apply once daily at bedtime; once excessive sweating has stopped, may decrease to once or twice weekly, or as needed. Wash treated area in the morning. 60 mL 3    Tirzepatide-Weight Management (ZEPBOUND) 2.5 MG/0.5ML Subcutaneous Solution Auto-injector Inject 2.5 mg into the skin once a week for 4 doses. 2 mL 0    PEG 3350-KCl-Na Bicarb-NaCl (TRILYTE) 420 g Oral Recon Soln Take prep as directed by gastro office. May substitute with Trilyte/generic equivalent if needed. 4000 mL 0    metroNIDAZOLE 0.75 % External Gel 1 topical application daily as needed for rosacea 45 g 0   [2] No Known Allergies  [3]   Past Medical History:   Anxiety    Depression    Essential hypertension   [4]   Past Surgical History:  Procedure Laterality Date    Cholecystectomy  2003    Foot fracture surgery     [5]   Family History  Problem Relation Age of Onset    Uterine Cancer Sister    [6]   Social History  Socioeconomic History    Marital status:    Tobacco Use    Smoking status: Never     Passive exposure: Never    Smokeless tobacco: Never   Substance and Sexual Activity    Alcohol use: Not  Currently    Drug use: Never

## 2025-05-08 ENCOUNTER — NURSE ONLY (OUTPATIENT)
Facility: CLINIC | Age: 50
End: 2025-05-08

## 2025-05-08 DIAGNOSIS — Z12.11 COLON CANCER SCREENING: Primary | ICD-10-CM

## 2025-05-08 NOTE — PROGRESS NOTES
Scheduled for:  Colonoscopy 286[address]   Location:  [facility]  21+10=31  Provider: Alex Solano MD    Date:  7/31/2025 Thursday  Time:   12:30 pm  (Patient made aware will receive phone call the day before with an arrival time)    Sedation:  MAC  Prep:  Split GoLytely    Diagnosis with codes:    ICD-10-CM   1. Colon cancer screening  Z12.11        Meds/Allergies Reconciled?:   Provider Reviewed   Was patient informed to call insurance with codes (Y/N):  Yes  Referral sent?: Referral was sent at the time of electronic surgical scheduling.  EM or [facility] notified?: I sent an electronic request to ENDO Scheduling and received a confirmation today.     Medication Orders:  Patient is aware to NOT take iron pills, herbal meds and diet supplements for 7 days before exam. Also to NOT take any form of alcohol, recreational drugs and any forms of ED meds 24 hours before exam.     Misc Orders:  N/A   Further instructions given by staff:  I Provided prep instructions to patient and reviewed date, time and location. Patient verbalized that  She / Her understood and is aware to call with any questions.  Patient was informed about the new cancellation policy for She / Her procedure. Patient was also given copy of the cancellation policy at the time of the appointment and verbalized understanding.

## 2025-05-08 NOTE — PROGRESS NOTES
Called patient for scheduled TCS/FIT+ result.   Medications, pharmacy, and allergies reviewed.   Please advise on colonoscopy and bowel prep orders.     Age 45-74 y/o:   › MD preference: None   › Insurance: [facility] [place]   › Last PCP visit: 4/30/2025   Pcp within [facility]: Deb Jason MD   › Last CBC: 5/31/2024   › Date of positive FIT: n/a  › H/W/BMI: 60 in / 168 lbs / 32.81 kg/m²     Special comments/notes:    Telephone Colon Screening Questionnaire Yes No   Are you currently experiencing any new/abnormal GI symptoms? [] [x]   If yes, explain:                              Rectal bleeding?            [] [x]   Black stool?              [] [x]   Dysphagia or food \"feeling stuck\" when eating?    [] [x]   Intractable vomiting?          [] [x]   Unexplained weight loss?                        [] [x]   First colonoscopy?                         [x] []   Family history of colon cancer?           [] [x]   Any issues with anesthesia?                   [] [x]   If yes, explain:                                   Any recent complaints of chest pain or shortness of breath?  [] [x]   Referred to a cardiologist?  [] [x]   If yes, explain:             Stroke, MI (heart attack) or stent placement in the last 12 months:  [] [x]   History of  respiratory issues/oxygen/RUSS/COPD: [] [x]   If yes, CPAP/BiPAP:                                    History of devices (pacemaker/defibrillator) [] [x]   History of cardiac/CVA/(MI/stroke):           [] [x]     Medications Yes  No   Anticoagulants (except Aspirin):                [] [x]   Diabetic Meds                                             PO: Hold day before and day of procedure  Insulin:                                    [] [x]   Weight loss meds (phentermine/vyvanse/saxsenda/etc):                   [] [x]   Iron/herbal/multivitamin supplement:                          [] [x]   Usage of marijuana, CBD &/or vape products:                        [] [x]

## 2025-05-08 NOTE — PROGRESS NOTES
GI Staff:  TCS Colon Screening Orders    Please schedule: Colonoscopy 95865 with MAC     Please send split dose Golytely bowel prep     Diagnosis: Colon Screening Z12.11     Medication adjustments:  None     >>>Please inform patient if new medications are started after scheduling procedure they need to call clinic to notify us.

## 2025-05-24 ENCOUNTER — LAB ENCOUNTER (OUTPATIENT)
Dept: LAB | Facility: HOSPITAL | Age: 50
End: 2025-05-24
Attending: FAMILY MEDICINE
Payer: COMMERCIAL

## 2025-05-24 LAB
ALBUMIN SERPL-MCNC: 4.6 G/DL (ref 3.2–4.8)
ALBUMIN/GLOB SERPL: 1.7 {RATIO} (ref 1–2)
ALP LIVER SERPL-CCNC: 82 U/L (ref 41–108)
ALT SERPL-CCNC: 44 U/L (ref 10–49)
ANION GAP SERPL CALC-SCNC: 6 MMOL/L (ref 0–18)
AST SERPL-CCNC: 36 U/L (ref ?–34)
BILIRUB SERPL-MCNC: 0.5 MG/DL (ref 0.3–1.2)
BUN BLD-MCNC: 14 MG/DL (ref 9–23)
BUN/CREAT SERPL: 16.9 (ref 10–20)
CALCIUM BLD-MCNC: 9.2 MG/DL (ref 8.7–10.4)
CHLORIDE SERPL-SCNC: 109 MMOL/L (ref 98–112)
CHOLEST SERPL-MCNC: 208 MG/DL (ref ?–200)
CO2 SERPL-SCNC: 27 MMOL/L (ref 21–32)
CREAT BLD-MCNC: 0.83 MG/DL (ref 0.55–1.02)
EGFRCR SERPLBLD CKD-EPI 2021: 85 ML/MIN/1.73M2 (ref 60–?)
EST. AVERAGE GLUCOSE BLD GHB EST-MCNC: 120 MG/DL (ref 68–126)
FASTING PATIENT LIPID ANSWER: YES
FASTING STATUS PATIENT QL REPORTED: YES
GLOBULIN PLAS-MCNC: 2.7 G/DL (ref 2–3.5)
GLUCOSE BLD-MCNC: 89 MG/DL (ref 70–99)
HBA1C MFR BLD: 5.8 % (ref ?–5.7)
HDLC SERPL-MCNC: 47 MG/DL (ref 40–59)
LDLC SERPL CALC-MCNC: 129 MG/DL (ref ?–100)
NONHDLC SERPL-MCNC: 161 MG/DL (ref ?–130)
OSMOLALITY SERPL CALC.SUM OF ELEC: 294 MOSM/KG (ref 275–295)
POTASSIUM SERPL-SCNC: 4.1 MMOL/L (ref 3.5–5.1)
PROT SERPL-MCNC: 7.3 G/DL (ref 5.7–8.2)
SODIUM SERPL-SCNC: 142 MMOL/L (ref 136–145)
TRIGL SERPL-MCNC: 182 MG/DL (ref 30–149)
TSI SER-ACNC: 2.67 UIU/ML (ref 0.55–4.78)
VLDLC SERPL CALC-MCNC: 33 MG/DL (ref 0–30)

## 2025-05-24 PROCEDURE — 86709 HEPATITIS A IGM ANTIBODY: CPT | Performed by: FAMILY MEDICINE

## 2025-05-24 PROCEDURE — 86708 HEPATITIS A ANTIBODY: CPT | Performed by: FAMILY MEDICINE

## 2025-05-24 PROCEDURE — 80061 LIPID PANEL: CPT | Performed by: FAMILY MEDICINE

## 2025-05-24 PROCEDURE — 85025 COMPLETE CBC W/AUTO DIFF WBC: CPT | Performed by: FAMILY MEDICINE

## 2025-05-24 PROCEDURE — 86706 HEP B SURFACE ANTIBODY: CPT | Performed by: FAMILY MEDICINE

## 2025-05-24 PROCEDURE — 84443 ASSAY THYROID STIM HORMONE: CPT | Performed by: FAMILY MEDICINE

## 2025-05-24 PROCEDURE — 83036 HEMOGLOBIN GLYCOSYLATED A1C: CPT | Performed by: FAMILY MEDICINE

## 2025-05-24 PROCEDURE — 36415 COLL VENOUS BLD VENIPUNCTURE: CPT | Performed by: FAMILY MEDICINE

## 2025-05-24 PROCEDURE — 86704 HEP B CORE ANTIBODY TOTAL: CPT | Performed by: FAMILY MEDICINE

## 2025-05-24 PROCEDURE — 82248 BILIRUBIN DIRECT: CPT | Performed by: FAMILY MEDICINE

## 2025-05-24 PROCEDURE — 86803 HEPATITIS C AB TEST: CPT | Performed by: FAMILY MEDICINE

## 2025-05-24 PROCEDURE — 80053 COMPREHEN METABOLIC PANEL: CPT | Performed by: FAMILY MEDICINE

## 2025-05-24 PROCEDURE — 87340 HEPATITIS B SURFACE AG IA: CPT | Performed by: FAMILY MEDICINE

## 2025-05-24 PROCEDURE — 80503 PATH CLIN CONSLTJ SF 5-20: CPT | Performed by: FAMILY MEDICINE

## 2025-05-27 ENCOUNTER — TELEPHONE (OUTPATIENT)
Age: 50
End: 2025-05-27

## 2025-05-27 NOTE — TELEPHONE ENCOUNTER
----- Message from Deb Jason sent at 5/25/2025  4:19 PM CDT -----  Please let her know:  Your hepatitis panel is showing passive immunity to hepatitis A however your hepatitis B immunity has worn off. If you desire, we can revaccinate you with the 3-dose series for this  CBC overall stable  ----- Message -----  From: Lab, Background User  Sent: 5/24/2025   7:32 AM CDT  To: Deb Jason MD

## 2025-05-27 NOTE — TELEPHONE ENCOUNTER
Spoke to patient,  verified, results below given, nurse visit appointment for Hep B vaccine was scheduled

## 2025-06-06 ENCOUNTER — TELEPHONE (OUTPATIENT)
Age: 50
End: 2025-06-06

## 2025-06-06 NOTE — TELEPHONE ENCOUNTER
----- Message from Deb Jason sent at 5/25/2025  4:17 PM CDT -----  Please let her know    -Tus electrolytas, ri~nones, tiroide estan normal. Los numeros del higado estan mejorando y ya tejas normalizandose.  -Tu colesterol esta un poco elevado. Dieta baja en grasa y ejercisio es necesario para prevenir estar en medicina.  -Tienes prediabetes y necesitas lorene dieta baja en carbohydratos y azucar al igual que ejercisio para prevenir que desarrolles diabetes en un futuro cercano      ----- Message -----  From: Lab, Background User  Sent: 5/24/2025   7:48 AM CDT  To: Deb Jason MD

## 2025-07-11 ENCOUNTER — HOSPITAL ENCOUNTER (OUTPATIENT)
Dept: MAMMOGRAPHY | Age: 50
Discharge: HOME | End: 2025-07-11
Attending: FAMILY MEDICINE
Payer: COMMERCIAL

## 2025-07-11 DIAGNOSIS — Z12.31 ENCOUNTER FOR SCREENING MAMMOGRAM FOR MALIGNANT NEOPLASM OF BREAST: ICD-10-CM

## 2025-07-11 PROCEDURE — 77063 BREAST TOMOSYNTHESIS BI: CPT | Performed by: FAMILY MEDICINE

## 2025-07-11 PROCEDURE — 77067 SCR MAMMO BI INCL CAD: CPT | Performed by: FAMILY MEDICINE

## 2025-07-23 ENCOUNTER — TELEPHONE (OUTPATIENT)
Facility: CLINIC | Age: 50
End: 2025-07-23

## 2025-07-23 NOTE — TELEPHONE ENCOUNTER
Welsh  Attempted to contact patient to confirm procedure on 7/31/2025.  Left Voicemail to call back with any changes to medication, insurance or questions about the procedure.   Patient will receive another phone call with an arrival time within the week.

## 2025-07-30 ENCOUNTER — TELEPHONE (OUTPATIENT)
Facility: CLINIC | Age: 50
End: 2025-07-30

## 2025-07-31 ENCOUNTER — ANESTHESIA (OUTPATIENT)
Dept: ENDOSCOPY | Age: 50
End: 2025-07-31
Payer: COMMERCIAL

## 2025-07-31 ENCOUNTER — ANESTHESIA EVENT (OUTPATIENT)
Dept: ENDOSCOPY | Age: 50
End: 2025-07-31
Payer: COMMERCIAL

## 2025-07-31 ENCOUNTER — HOSPITAL ENCOUNTER (OUTPATIENT)
Age: 50
Setting detail: HOSPITAL OUTPATIENT SURGERY
Discharge: HOME | End: 2025-07-31
Attending: INTERNAL MEDICINE | Admitting: INTERNAL MEDICINE

## 2025-07-31 VITALS
OXYGEN SATURATION: 99 % | HEART RATE: 86 BPM | HEIGHT: 60 IN | BODY MASS INDEX: 31.41 KG/M2 | SYSTOLIC BLOOD PRESSURE: 117 MMHG | RESPIRATION RATE: 17 BRPM | DIASTOLIC BLOOD PRESSURE: 71 MMHG | WEIGHT: 160 LBS

## 2025-07-31 DIAGNOSIS — Z12.11 COLON CANCER SCREENING: ICD-10-CM

## 2025-07-31 PROBLEM — K64.9 HEMORRHOIDS: Status: ACTIVE | Noted: 2025-07-31

## 2025-07-31 PROBLEM — K63.5 POLYP OF COLON: Status: ACTIVE | Noted: 2025-07-31

## 2025-07-31 PROCEDURE — 45380 COLONOSCOPY AND BIOPSY: CPT | Performed by: INTERNAL MEDICINE

## 2025-07-31 PROCEDURE — 99070 SPECIAL SUPPLIES PHYS/QHP: CPT | Performed by: INTERNAL MEDICINE

## 2025-07-31 RX ORDER — SODIUM CHLORIDE, SODIUM LACTATE, POTASSIUM CHLORIDE, CALCIUM CHLORIDE 600; 310; 30; 20 MG/100ML; MG/100ML; MG/100ML; MG/100ML
INJECTION, SOLUTION INTRAVENOUS CONTINUOUS
Status: DISCONTINUED | OUTPATIENT
Start: 2025-07-31 | End: 2025-07-31

## 2025-07-31 RX ORDER — NALOXONE HYDROCHLORIDE 0.4 MG/ML
0.08 INJECTION, SOLUTION INTRAMUSCULAR; INTRAVENOUS; SUBCUTANEOUS ONCE AS NEEDED
Status: DISCONTINUED | OUTPATIENT
Start: 2025-07-31 | End: 2025-07-31

## 2025-07-31 RX ORDER — ONDANSETRON 2 MG/ML
4 INJECTION INTRAMUSCULAR; INTRAVENOUS ONCE AS NEEDED
Status: DISCONTINUED | OUTPATIENT
Start: 2025-07-31 | End: 2025-07-31

## 2025-07-31 RX ORDER — LIDOCAINE HYDROCHLORIDE 10 MG/ML
INJECTION, SOLUTION EPIDURAL; INFILTRATION; INTRACAUDAL; PERINEURAL AS NEEDED
Status: DISCONTINUED | OUTPATIENT
Start: 2025-07-31 | End: 2025-07-31 | Stop reason: SURG

## 2025-07-31 RX ADMIN — SODIUM CHLORIDE, SODIUM LACTATE, POTASSIUM CHLORIDE, CALCIUM CHLORIDE: 600; 310; 30; 20 INJECTION, SOLUTION INTRAVENOUS at 11:50:00

## 2025-07-31 RX ADMIN — SODIUM CHLORIDE, SODIUM LACTATE, POTASSIUM CHLORIDE, CALCIUM CHLORIDE: 600; 310; 30; 20 INJECTION, SOLUTION INTRAVENOUS at 11:32:00

## 2025-07-31 RX ADMIN — LIDOCAINE HYDROCHLORIDE 50 MG: 10 INJECTION, SOLUTION EPIDURAL; INFILTRATION; INTRACAUDAL; PERINEURAL at 11:33:00

## 2025-08-04 ENCOUNTER — TELEPHONE (OUTPATIENT)
Facility: CLINIC | Age: 50
End: 2025-08-04

## (undated) DEVICE — Device

## (undated) DEVICE — KIT ENDO ORCAPOD 160/180/190

## (undated) DEVICE — KIT CLEAN ENDOKIT 1.1OZ GOWNX2

## (undated) DEVICE — KIT MFLD FOR SPEC COLL

## (undated) DEVICE — TUBING SCT CLR 6FT .25IN MDVC

## (undated) NOTE — Clinical Note
Kayden - I saw Malu today with LIZABETH. I've recommended bladder testing. I will work to manage her sx. I appreciate the opportunity to participate in her care. Thanks, Adelina

## (undated) NOTE — MR AVS SNAPSHOT
After Visit Summary   6/5/2024    Malu Madrid   MRN: [id number]           Visit Information     Date & Time  6/5/2024  2:45 PM Provider  Bi Block MD [facility] - OB/GYN Dept. Phone  [phone]      Your Vitals Were  Most recent update: 6/5/2024  2:47 PM    BP   129/80          Pulse   68          Ht   60\"          Wt   175 lb          Breastfeeding   No             BMI   34.18 kg/m²         Allergies as of 6/5/2024  Review status set to Review Complete on 6/5/2024   No Known Allergies     Your Current Medications        Dosage    Propranolol HCl ER 80 MG Oral Capsule SR 24 Hr Take 1 capsule (80 mg total) by mouth daily.    citalopram 20 MG Oral Tab Take 1 tablet (20 mg total) by mouth daily.    SUMAtriptan 50 MG Oral Tab Take 1 tablet (50 mg total) by mouth every 2 (two) hours as needed for Migraine (MAX 2 tablets in 24hrs).    metroNIDAZOLE 0.75 % External Gel 1 topical application daily as needed for rosacea    Aluminum Chloride (DRYSOL) 20 % External Solution Apply once daily at bedtime; once excessive sweating has stopped, may decrease to once or twice weekly, or as needed. Wash treated area in the morning.      Diagnoses for This Visit    Normal gynecologic examination   [[id number]]  -  Primary  Stress incontinence   [[id number]]    Stress incontinence in female   [[id number]]             Follow-up    Return in about 1 year (around 6/5/2025) for Annual exam.     We Ordered the Following     Normal Orders This Visit    Chlamydia/Gc Amplification [[id number] CUSTOM]     OP REFERRAL TO UROGYNECOLOGY CLINIC [[id number] CUSTOM]     ThinPrep PAP with HPV Reflex Request [[id number] CUSTOM]     ThinPrep Pap with HPV Reflex, Chlamydia/GC [[id number] CUSTOM]     Future Labs/Procedures Expected by Expires    ThinPrep Pap with HPV Reflex, Chlamydia/GC [[id number] CUSTOM]  6/5/2024 6/5/2025      Future Appointments        Provider Department    7/9/2024 3:40 PM [facility]  [facility]    7/30/2024 3:30 PM Keyla Nichols [facility]      Follow-up Instructions    Return in about 1 year (around 6/5/2025) for Annual exam.         June 6, 2024      Malu [address] [facility] [address]     Dear Malu :    Thank you for enrolling in [employer]. Please follow the instructions below to securely access your online medical record. [employer] allows you to send messages to your doctor, view test results, renew prescriptions and request appointments.    How Do I Sign Up?  1. In your Internet browser, go to [email or web address]  2. Click on the Activate your Account if you have an activation code in the box under the *New User? section.   3. Enter your [employer] Activation Code exactly as it appears below. You will not need to use this code after you have completed the sign-up process. If you do not sign up before the expiration date, you must request a new code.    [employer] Activation Code: [email or web address]  Expires: 6/6/2024  4:14 PM    4. Enter your Zip Code and Date of Birth (mm/dd/yyyy) as indicated and click Next. You will be taken to the next sign-up page.  5. Create a [employer] Username. This will be your [employer] login Username and cannot be changed, so think of one that is secure and easy to remember.  6. Create a [employer] password. You can change your password at any time.  7. Choose a Security Question and enter your Answer and click Next. This can be used at a later time if you forget your password.   8. Enter your e-mail address. You will receive e-mail notification when new information is available in [employer].  9. Click Sign In. You can now view your medical record.    Additional Information  If you have questions, you can call [phone] to talk to our [employer] staff. Remember, [employer] is NOT to be used for urgent needs. For medical emergencies, dial 911.    Sincerely,    Umair  Bi CAMPBELL MD              Did you know that [facility] primary care physicians now offer Video Visits through [employer] for adult patients for a variety of conditions such as allergies, back pain and cold symptoms? Skip the drive and waiting room and online chat with a doctor face-to-face using your web-cam enabled computer or mobile device wherever you are. Video Visits cost $50 and can be paid hassle-free using a credit, debit, or health savings card.  Not active on [employer]? Ask us how to get signed up today!          If you receive a survey from [employer], please take a few minutes to complete it and provide feedback. We strive to deliver the best patient experience and are looking for ways to make improvements. Your feedback will help us do so. For more information on [employer], please visit [email or web address]           No text in SmartText           No text in SmartText

## (undated) NOTE — LETTER
09/13/24        Malu Sher  [address] [facility] [address]      Dear Malu,    Our records indicate that you have outstanding lab work and or testing that was ordered for you and has not yet been completed:  Helicobacter Pylori Breath Test, Adult (Order #[id number]) on 7/30/24     To provide you with the best possible care, please complete these orders at your earliest convenience. If you have recently completed these orders please disregard this letter.     If you have any questions please call the office at [phone].    Thank you,     [facility]  Keyla Nichols, APRN  [address]  [place] [address]